# Patient Record
Sex: MALE | Race: WHITE | Employment: FULL TIME | URBAN - NONMETROPOLITAN AREA
[De-identification: names, ages, dates, MRNs, and addresses within clinical notes are randomized per-mention and may not be internally consistent; named-entity substitution may affect disease eponyms.]

---

## 2017-05-08 ENCOUNTER — OFFICE VISIT (OUTPATIENT)
Dept: FAMILY MEDICINE CLINIC | Age: 37
End: 2017-05-08
Payer: COMMERCIAL

## 2017-05-08 VITALS
HEIGHT: 74 IN | WEIGHT: 250 LBS | SYSTOLIC BLOOD PRESSURE: 118 MMHG | BODY MASS INDEX: 32.08 KG/M2 | HEART RATE: 72 BPM | DIASTOLIC BLOOD PRESSURE: 66 MMHG

## 2017-05-08 DIAGNOSIS — M26.609 TMJ (TEMPOROMANDIBULAR JOINT DISORDER): ICD-10-CM

## 2017-05-08 DIAGNOSIS — E78.2 MIXED HYPERLIPIDEMIA: ICD-10-CM

## 2017-05-08 DIAGNOSIS — K62.5 RECTAL BLEEDING: Primary | ICD-10-CM

## 2017-05-08 DIAGNOSIS — M26.609 TMJ (TEMPOROMANDIBULAR JOINT SYNDROME): ICD-10-CM

## 2017-05-08 PROCEDURE — 99203 OFFICE O/P NEW LOW 30 MIN: CPT | Performed by: FAMILY MEDICINE

## 2017-05-08 ASSESSMENT — PATIENT HEALTH QUESTIONNAIRE - PHQ9
SUM OF ALL RESPONSES TO PHQ9 QUESTIONS 1 & 2: 0
1. LITTLE INTEREST OR PLEASURE IN DOING THINGS: 0
SUM OF ALL RESPONSES TO PHQ QUESTIONS 1-9: 0
2. FEELING DOWN, DEPRESSED OR HOPELESS: 0

## 2017-05-08 ASSESSMENT — ENCOUNTER SYMPTOMS
DIARRHEA: 0
SINUS PRESSURE: 0
BLOOD IN STOOL: 0
FACIAL SWELLING: 0
NAUSEA: 0
BACK PAIN: 0
WHEEZING: 0
COUGH: 0
RHINORRHEA: 0
CONSTIPATION: 0
SORE THROAT: 0
RECTAL PAIN: 0
ABDOMINAL PAIN: 0

## 2020-03-06 ENCOUNTER — HOSPITAL ENCOUNTER (OUTPATIENT)
Age: 40
Setting detail: SPECIMEN
Discharge: HOME | End: 2020-03-06
Payer: COMMERCIAL

## 2020-03-08 LAB
CULTURE: NO GROWTH
Lab: NORMAL
SPECIMEN DESCRIPTION: NORMAL

## 2020-12-04 ENCOUNTER — OFFICE VISIT (OUTPATIENT)
Dept: PRIMARY CARE CLINIC | Age: 40
End: 2020-12-04
Payer: COMMERCIAL

## 2020-12-04 VITALS
OXYGEN SATURATION: 99 % | TEMPERATURE: 98.2 F | SYSTOLIC BLOOD PRESSURE: 147 MMHG | HEART RATE: 90 BPM | DIASTOLIC BLOOD PRESSURE: 90 MMHG | HEIGHT: 72 IN | BODY MASS INDEX: 35.21 KG/M2 | WEIGHT: 260 LBS

## 2020-12-04 LAB
BILIRUBIN, POC: ABNORMAL
BLOOD URINE, POC: ABNORMAL
CLARITY, POC: CLEAR
COLOR, POC: YELLOW
GLUCOSE URINE, POC: ABNORMAL
KETONES, POC: ABNORMAL
LEUKOCYTE EST, POC: ABNORMAL
NITRITE, POC: ABNORMAL
PH, POC: 7.5
PROTEIN, POC: ABNORMAL
SPECIFIC GRAVITY, POC: 1.01
UROBILINOGEN, POC: ABNORMAL

## 2020-12-04 PROCEDURE — G8417 CALC BMI ABV UP PARAM F/U: HCPCS | Performed by: NURSE PRACTITIONER

## 2020-12-04 PROCEDURE — 99214 OFFICE O/P EST MOD 30 MIN: CPT | Performed by: NURSE PRACTITIONER

## 2020-12-04 PROCEDURE — G8484 FLU IMMUNIZE NO ADMIN: HCPCS | Performed by: NURSE PRACTITIONER

## 2020-12-04 PROCEDURE — 1036F TOBACCO NON-USER: CPT | Performed by: NURSE PRACTITIONER

## 2020-12-04 PROCEDURE — 81003 URINALYSIS AUTO W/O SCOPE: CPT | Performed by: NURSE PRACTITIONER

## 2020-12-04 PROCEDURE — G8427 DOCREV CUR MEDS BY ELIG CLIN: HCPCS | Performed by: NURSE PRACTITIONER

## 2020-12-04 RX ORDER — CLOTRIMAZOLE AND BETAMETHASONE DIPROPIONATE 10; .64 MG/G; MG/G
CREAM TOPICAL
Qty: 1 TUBE | Refills: 0 | Status: SHIPPED | OUTPATIENT
Start: 2020-12-04

## 2020-12-04 RX ORDER — FLUCONAZOLE 100 MG/1
100 TABLET ORAL DAILY
Qty: 7 TABLET | Refills: 0 | Status: SHIPPED | OUTPATIENT
Start: 2020-12-04 | End: 2020-12-11

## 2020-12-04 ASSESSMENT — ENCOUNTER SYMPTOMS
DIARRHEA: 0
COUGH: 0
EYES NEGATIVE: 1
EYE ITCHING: 0
NAUSEA: 0
CHEST TIGHTNESS: 0
ABDOMINAL PAIN: 0
ALLERGIC/IMMUNOLOGIC NEGATIVE: 1
SORE THROAT: 0
VOMITING: 0
SHORTNESS OF BREATH: 0
EYE DISCHARGE: 0

## 2020-12-04 NOTE — PATIENT INSTRUCTIONS
Patient Education        fluconazole (oral/injection)  Pronunciation:  mandeep faulkner  Brand:  Diflucan  What is the most important information I should know about fluconazole? Tell your doctor about all your current medicines and any you start or stop using. Many drugs can interact, and some drugs should not be used together. What is fluconazole? Fluconazole is an antifungal medicine. Fluconazole is used to treat infections caused by fungus, which can invade any part of the body including the mouth, throat, esophagus, lungs, bladder, genital area, and the blood. Fluconazole is also used to prevent fungal infection in people who have a weak immune system caused by cancer treatment, bone marrow transplant, or diseases such as AIDS. Fluconazole is also used to treat a certain type of meningitis in people with HIV or AIDS. Fluconazole may also be used for purposes not listed in this medication guide. What should I discuss with my healthcare provider before taking fluconazole? You should not use fluconazole if you are allergic to it. Some medicines can cause unwanted or dangerous effects when used with fluconazole. Your doctor may change your treatment plan if you also use:  · an antibiotic, antifungal, or antiviral medicine;  · a blood thinner;  · cancer medicine;  · cholesterol medication;  · oral diabetes medicine;  · heart or blood pressure medication;  · medicine for malaria or tuberculosis;  · medicine to prevent organ transplant rejection;  · medicine to treat depression or mental illness;  · an NSAID (nonsteroidal anti-inflammatory drug);  · seizure medicine; or  · steroid medicine. Tell your doctor if you have ever had:  · liver disease;  · HIV or AIDS;  · cancer;  · heart problems;  · long QT syndrome (in you or a family member);  · kidney disease; or  · if you are allergic to other antifungal medicine (such as ketoconazole, itraconazole, miconazole, posaconazole, voriconazole, and others).   The liquid form of fluconazole contains sucrose. Talk to your doctor before using this form of fluconazole if you have a problem digesting sugars or milk. It is not known whether this medicine will harm an unborn baby. Tell your doctor if you are pregnant or plan to become pregnant. It may not be safe to breastfeed while using this medicine. Ask your doctor about any risk. How should I take fluconazole? Follow all directions on your prescription label and read all medication guides or instruction sheets. Use the medicine exactly as directed. Your dose will depend on the infection you are treating. Vaginal infections are often treated with only one pill. For other infections, your first dose may be a double dose. Carefully follow your doctor's instructions. Fluconazole oral  is taken by mouth. Fluconazole injection is given as an infusion into a vein. You may take fluconazole with or without food. Shake the oral suspension (liquid) before you measure a dose. Use the dosing syringe provided, or use a medicine dose-measuring device (not a kitchen spoon). Fluconazole injection is given as an infusion into a vein. A healthcare provider will give your first dose and may teach you how to properly use the medication by yourself. Prepare an injection only when you are ready to give it. Do not use if the medicine looks cloudy, has changed colors, or has particles in it. Call your pharmacist for new medicine. Use this medicine for the full prescribed length of time, even if your symptoms quickly improve. Skipping doses can increase your risk of infection that is resistant to medication. Fluconazole will not treat a viral infection such as the flu or a common cold. Call your doctor if your symptoms do not improve, or if they get worse. Store fluconazole at room temperature away from moisture and heat. Do not freeze. You may store the oral suspension in a refrigerator, but do not allow it to freeze.  Throw away any leftover liquid that is more than 3weeks old. What happens if I miss a dose? Use the medicine as soon as you can, but skip the missed dose if it is almost time for your next dose. Do not use two doses at one time. What happens if I overdose? Seek emergency medical attention or call the Poison Help line at 1-814.927.3137. Overdose symptoms may include confusion or unusual thoughts or behavior. What should I avoid while using fluconazole? Follow your doctor's instructions about any restrictions on food, beverages, or activity. Avoid driving or hazardous activity until you know how this medicine will affect you. Your reactions could be impaired. What are the possible side effects of fluconazole? Get emergency medical help if you have signs of an allergic reaction (hives, difficult breathing, swelling in your face or throat) or a severe skin reaction (fever, sore throat, burning eyes, skin pain, red or purple skin rash with blistering and peeling). Call your doctor at once if you have:  · fast or pounding heartbeats, fluttering in your chest, shortness of breath, and sudden dizziness (like you might pass out);  · fever, chills, body aches, flu symptoms;  · easy bruising or bleeding, unusual weakness;  · seizure (convulsions);  · skin rash or skin lesions; or  · liver problems --loss of appetite, stomach pain (upper right side), dark urine, chaya-colored stools, jaundice (yellowing of the skin or eyes). Common side effects may include:  · nausea, stomach pain, diarrhea, upset stomach;  · headache;  · dizziness; or  · changes in your sense of taste. This is not a complete list of side effects and others may occur. Call your doctor for medical advice about side effects. You may report side effects to FDA at 2-389-WMU-3749. What other drugs will affect fluconazole? Sometimes it is not safe to use certain medications at the same time.  Some drugs can affect your blood levels of other drugs you take, which may increase side effects or make the medications less effective. Fluconazole can cause a serious heart problem. Your risk may be higher if you also use certain other medicines for infections, asthma, heart problems, high blood pressure, depression, mental illness, cancer, malaria, or HIV. Many drugs can affect fluconazole, and some drugs should not be used at the same time. Tell your doctor about all your current medicines and any medicine you start or stop using. This includes prescription and over-the-counter medicines, vitamins, and herbal products. Not all possible interactions are listed here. Where can I get more information? Your pharmacist can provide more information about fluconazole. Remember, keep this and all other medicines out of the reach of children, never share your medicines with others, and use this medication only for the indication prescribed. Every effort has been made to ensure that the information provided by [address] is accurate, up-to-date, and complete, but no guarantee is made to that effect. Drug information contained herein may be time sensitive. [facility] information has been compiled for use by healthcare practitioners and consumers in the [place] and therefore [employer] does not warrant that uses outside of the [place] are appropriate, unless specifically indicated otherwise. [facility]'s drug information does not endorse drugs, diagnose patients or recommend therapy. [facility]"[employer]s drug information is an informational resource designed to assist licensed healthcare practitioners in caring for their patients and/or to serve consumers viewing this service as a supplement to, and not a substitute for, the expertise, skill, knowledge and judgment of healthcare practitioners.  The absence of a warning for a given drug or drug combination in no way should be construed to indicate that the drug or drug combination is safe, effective or appropriate for any given patient. [facility] does not assume any responsibility for any aspect of healthcare administered with the aid of information [facility] provides. The information contained herein is not intended to cover all possible uses, directions, precautions, warnings, drug interactions, allergic reactions, or adverse effects. If you have questions about the drugs you are taking, check with your doctor, nurse or pharmacist.  Copyright 8479-9507 [address]. Version: 11.01. Revision date: 10/2/2019. Care instructions adapted under license by [facility] ([facility]). If you have questions about a medical condition or this instruction, always ask your healthcare professional. [place] [address] any warranty or liability for your use of this information.

## 2020-12-04 NOTE — PROGRESS NOTES
[address] [place] [address]  Dept: [phone]  Dept Fax: [phone]    Bill Bass is a 47 y.o. male who presents today forhis medical conditions/complaints as noted below. Bill Bass is c/o of   Chief Complaint   Patient presents with    Other     fungal infection recurring last episode was in March. Pt wants a referral      HPI:     Rash   This is a new problem. The current episode started more than 1 month ago (ongoing for over 11 months ). The affected locations include the genitalia. The rash is characterized by redness. It is unknown if there was an exposure to a precipitant. Pertinent negatives include no congestion, cough, diarrhea, fatigue, fever, shortness of breath, sore throat or vomiting. Past treatments include antibiotics (Lotrisone, Nystatin, and Keflex ). Improvement on treatment: waxing and waning with [address] about 1 1/2 weeks ago. Was using Keflex and Lotrisone cream. Has been using Lotrisone PRN. This has been an ongoing issue since 01/2020. Was told may need Derm referral.   Denies any PMH DM. Reports maternal grandmother had DM II. Last time had blood work done over 4 years. Past Medical History:   Diagnosis Date    Allergic rhinitis     multiple environmental allergies      No past surgical history on file.     Family History   Problem Relation Age of Onset    Elevated Lipids Father     High Cholesterol Father     No Known Problems Brother     Coronary Art Dis Maternal Grandfather     Coronary Art Dis Paternal Grandmother     Cancer Paternal Grandfather      Social History     Tobacco Use    Smoking status: Never Smoker    Smokeless tobacco: Never Used   Substance Use Topics    Alcohol use: Yes     Comment: occasionally      Current Outpatient Medications   Medication Sig Dispense Refill    clotrimazole-betamethasone (LOTRISONE) 1-0.05 % cream Apply BID x's 2 weeks 1 Tube 0    fluconazole